# Patient Record
Sex: MALE | Race: WHITE | Employment: UNEMPLOYED | URBAN - METROPOLITAN AREA
[De-identification: names, ages, dates, MRNs, and addresses within clinical notes are randomized per-mention and may not be internally consistent; named-entity substitution may affect disease eponyms.]

---

## 2024-07-18 ENCOUNTER — HOSPITAL ENCOUNTER (EMERGENCY)
Age: 49
Discharge: HOME | End: 2024-07-19
Attending: EMERGENCY MEDICINE

## 2024-07-18 DIAGNOSIS — R41.82 ALTERED MENTAL STATUS, UNSPECIFIED ALTERED MENTAL STATUS TYPE: Primary | ICD-10-CM

## 2024-07-18 PROCEDURE — 99283 EMERGENCY DEPT VISIT LOW MDM: CPT

## 2024-07-18 RX ORDER — ONDANSETRON 2 MG/ML
4 INJECTION INTRAMUSCULAR; INTRAVENOUS ONCE
Status: DISCONTINUED | OUTPATIENT
Start: 2024-07-18 | End: 2024-07-19 | Stop reason: HOSPADM

## 2024-07-18 RX ORDER — NALOXONE HYDROCHLORIDE 0.4 MG/ML
0.4 INJECTION, SOLUTION INTRAMUSCULAR; INTRAVENOUS; SUBCUTANEOUS ONCE
Status: DISCONTINUED | OUTPATIENT
Start: 2024-07-18 | End: 2024-07-19 | Stop reason: HOSPADM

## 2024-07-18 ASSESSMENT — PAIN SCALES - GENERAL: PAINLEVEL_OUTOF10: 0

## 2024-07-18 ASSESSMENT — PAIN - FUNCTIONAL ASSESSMENT: PAIN_FUNCTIONAL_ASSESSMENT: 0-10

## 2024-07-19 VITALS
TEMPERATURE: 98.8 F | WEIGHT: 152 LBS | DIASTOLIC BLOOD PRESSURE: 73 MMHG | SYSTOLIC BLOOD PRESSURE: 104 MMHG | RESPIRATION RATE: 12 BRPM | HEART RATE: 64 BPM | OXYGEN SATURATION: 98 % | BODY MASS INDEX: 21.76 KG/M2 | HEIGHT: 70 IN

## 2024-07-19 NOTE — ED PROVIDER NOTES
[facility]  EMERGENCY DEPARTMENT ENCOUNTER        Pt Name: Matt Bond  MRN: [id number]  Birthdate 1975  Date of evaluation: 7/18/2024  Provider: Joselo Morris MD  PCP: No primary care provider on file.  Note Started: 1:52 AM EDT 7/19/24    CHIEF COMPLAINT       Chief Complaint   Patient presents with    Drug Overdose     Pt brought in by [facility] ems for r/o overdose       HISTORY OF PRESENT ILLNESS: 1 or more Elements   History From: Patient/EMS        Matt Bond is a 49 y.o. male who presents ED for evaluation of altered mental state.  EMS reports that called because the patient was minimally responsive.  Reports he was arousable but very somnolent.  There is concern for narcotic overdose.  On arrival to the ED the patient is arousable but slurs his speech and is somnolent.  He does desaturate when not stimulated and falls asleep quickly.  He denies taking medications.  He denies any injuries or trauma.  He denies chest pains abdominal pains numbness or weakness.     Nursing Notes were all reviewed and agreed with or any disagreements were addressed in the HPI.    REVIEW OF SYSTEMS :      Review of Systems    Positives and Pertinent negatives as per HPI.     SURGICAL HISTORY   History reviewed. No pertinent surgical history.    CURRENTMEDICATIONS     There are no discharge medications for this patient.      ALLERGIES     Patient has no known allergies.    FAMILYHISTORY     History reviewed. No pertinent family history.     SOCIAL HISTORY          SCREENINGS        [place] Coma Scale  Eye Opening: Spontaneous  Best Verbal Response: Oriented  Best Motor Response: Obeys commands  [place] Coma Scale Score: 15                CIWA Assessment  BP: 104/73  Pulse: 64           PHYSICAL EXAM  1 or more Elements     ED Triage Vitals [07/18/24 2237]   BP Temp Temp Source Pulse Respirations SpO2 Height Weight - Scale   132/87 98.8 °F (37.1 °C) Oral 95 12 92 % 1.778 m (5' 10\") 68.9  COURSE and DIFFERENTIAL DIAGNOSIS/MDM:   Vitals:    Vitals:    07/18/24 2315 07/18/24 2330 07/19/24 0000 07/19/24 0030   BP: 119/82 (!) 100/57 110/72 104/73   Pulse: 62 64     Resp:       Temp:       TempSrc:       SpO2: 94% 98% 94% 98%   Weight:       Height:           Patient was given the following medications:  Medications   naloxone (NARCAN) injection 0.4 mg (0.4 mg IntraVENous Not Given 7/19/24 0037)   ondansetron (ZOFRAN) injection 4 mg (4 mg IntraVENous Not Given 7/19/24 0037)             Is this patient to be included in the SEP-1 Core Measure due to severe sepsis or septic shock?   No   Exclusion criteria - the patient is NOT to be included for SEP-1 Core Measure due to:  2+ SIRS criteria are not met    CC/HPI Summary, DDx, ED Course, and Reassessment: Differential Diagnosis: Aspiration pneumonia, concurrent traumatic brain injury, Sepsis or other infection, Encephalopathy, Seizure, Metabolic derangement, Drug-Induced cardiac arrhythmia, other     49-year-old male presents ED for evaluation of altered mental state.  EMS reports concern for narcotic overdose.  On presentation patient is somnolent but arousable.  He does desaturate as per dyspneic.  Pupils are pinpoint.  He is moving all extremities purposefully and has no sensory deficits.  His NIH is 0 and he has a history of injury or trauma.     Patient was observed in the emergency department.  He did have multiple episodes where he would desaturate but with stimulation ox saturations improved into the high 90s to 100%..     Patient observed for prolonged time until became more awake and alert.  Patient significant other present present to the ED. on evaluation patient and significant other report feeling comfortable with discharge home.  Patient more awake and alert and is able to ambulate without difficulties .    CONSULTS: (Who and What was discussed)  None          Chronic Conditions: History reviewed. No pertinent past medical history.      Records

## 2024-07-19 NOTE — ED NOTES
Pt was observed for 2 hours. Narcan was pulled in anticipation for needing it. Pt did have transient desats, but recovered on own. Pt discharged after successfully POing water and ambulating. He was A&O x 4 and on RA.  Resources for primary care and addiction provided. EMS IV removed prior to DC.